# Patient Record
Sex: FEMALE | Race: WHITE | Employment: UNEMPLOYED | URBAN - METROPOLITAN AREA
[De-identification: names, ages, dates, MRNs, and addresses within clinical notes are randomized per-mention and may not be internally consistent; named-entity substitution may affect disease eponyms.]

---

## 2021-09-12 ENCOUNTER — HOSPITAL ENCOUNTER (EMERGENCY)
Age: 21
Discharge: HOME | End: 2021-09-12
Payer: COMMERCIAL

## 2021-09-12 VITALS
DIASTOLIC BLOOD PRESSURE: 73 MMHG | HEART RATE: 79 BPM | RESPIRATION RATE: 16 BRPM | SYSTOLIC BLOOD PRESSURE: 116 MMHG | TEMPERATURE: 98.1 F | HEIGHT: 65 IN | OXYGEN SATURATION: 96 % | BODY MASS INDEX: 17.04 KG/M2 | WEIGHT: 102.29 LBS

## 2021-09-12 DIAGNOSIS — B37.31 VAGINAL CANDIDIASIS: Primary | ICD-10-CM

## 2021-09-12 LAB
AMORPHOUS: ABNORMAL /HPF
BACTERIA WET PREP: ABNORMAL
BACTERIA: ABNORMAL /HPF
BILIRUBIN URINE: NEGATIVE
BLOOD, URINE: ABNORMAL
CLARITY: ABNORMAL
CLUE CELLS: ABNORMAL
COARSE CASTS, UA: ABNORMAL /LPF (ref 0–2)
COLOR: YELLOW
EPITHELIAL CELLS WET PREP: ABNORMAL
EPITHELIAL CELLS, UA: ABNORMAL /HPF (ref 0–5)
GLUCOSE URINE: NEGATIVE MG/DL
HCG(URINE) PREGNANCY TEST: NEGATIVE
KETONES, URINE: NEGATIVE MG/DL
LEUKOCYTE ESTERASE, URINE: ABNORMAL
MICROSCOPIC EXAMINATION: YES
NITRITE, URINE: NEGATIVE
PH UA: 7.5 (ref 5–8)
PROTEIN UA: NEGATIVE MG/DL
RBC UA: ABNORMAL /HPF (ref 0–4)
RBC WET PREP: ABNORMAL
SOURCE WET PREP: ABNORMAL
SPECIFIC GRAVITY UA: 1.01 (ref 1–1.03)
TRICHOMONAS PREP: ABNORMAL
URINE REFLEX TO CULTURE: ABNORMAL
URINE TYPE: ABNORMAL
UROBILINOGEN, URINE: 0.2 E.U./DL
WBC UA: ABNORMAL /HPF (ref 0–5)
WBC WET PREP: ABNORMAL
YEAST WET PREP: ABNORMAL
YEAST: ABNORMAL /HPF

## 2021-09-12 PROCEDURE — 84703 CHORIONIC GONADOTROPIN ASSAY: CPT

## 2021-09-12 PROCEDURE — 87591 N.GONORRHOEAE DNA AMP PROB: CPT

## 2021-09-12 PROCEDURE — 99283 EMERGENCY DEPT VISIT LOW MDM: CPT

## 2021-09-12 PROCEDURE — 87210 SMEAR WET MOUNT SALINE/INK: CPT

## 2021-09-12 PROCEDURE — 81001 URINALYSIS AUTO W/SCOPE: CPT

## 2021-09-12 PROCEDURE — 87491 CHLMYD TRACH DNA AMP PROBE: CPT

## 2021-09-12 RX ORDER — FLUCONAZOLE 150 MG/1
150 TABLET ORAL ONCE
Qty: 2 TABLET | Refills: 0 | Status: SHIPPED | OUTPATIENT
Start: 2021-09-12 | End: 2021-09-12

## 2021-09-12 ASSESSMENT — ENCOUNTER SYMPTOMS
BACK PAIN: 0
NAUSEA: 0
VOMITING: 0
EYE REDNESS: 0
SORE THROAT: 0
ABDOMINAL PAIN: 0
APNEA: 0
EYE DISCHARGE: 0
SHORTNESS OF BREATH: 0
CHOKING: 0
FACIAL SWELLING: 0

## 2021-09-13 NOTE — ED PROVIDER NOTES
Pt asked about getting her nightly trazodone. It was not listed in home medications or current MAR. Pt gave this nurse a list of her home medications and it was included. Pt stated that ED and 5T nurses asked about this med with med reconciliation. Unable to locate. Added to home medications and message sent to hospitalist to see if this can be added back in.   .Electronically signed by Nabil Rob RN on 9/25/2019 at 10:17 PM nausea and vomiting. Genitourinary: Positive for vaginal discharge. Negative for dysuria, menstrual problem, pelvic pain, vaginal bleeding and vaginal pain. Musculoskeletal: Negative for back pain, neck pain and neck stiffness. Neurological: Negative for dizziness, tremors, seizures, weakness and headaches. All other systems reviewed and are negative. \"Positives and Pertinent negatives as per HPI\"    Physical Exam:  Physical Exam  Vitals and nursing note reviewed. Constitutional:       Appearance: She is well-developed. She is not diaphoretic. HENT:      Head: Normocephalic and atraumatic. Nose: Nose normal.   Eyes:      General:         Right eye: No discharge. Left eye: No discharge. Cardiovascular:      Rate and Rhythm: Normal rate and regular rhythm. Heart sounds: Normal heart sounds. No murmur heard. No friction rub. No gallop. Pulmonary:      Effort: Pulmonary effort is normal. No respiratory distress. Breath sounds: Normal breath sounds. No wheezing or rales. Chest:      Chest wall: No tenderness. Abdominal:      General: Abdomen is flat. Bowel sounds are normal. There is no distension. Palpations: There is no mass. Tenderness: There is no abdominal tenderness. There is no guarding or rebound. Musculoskeletal:         General: Normal range of motion. Cervical back: Normal range of motion and neck supple. Skin:     General: Skin is warm and dry. Neurological:      Mental Status: She is alert and oriented to person, place, and time.    Psychiatric:         Behavior: Behavior normal.         MEDICAL DECISION MAKING    Vitals:    Vitals:    09/12/21 2035   BP: 116/73   Pulse: 79   Resp: 16   Temp: 98.1 °F (36.7 °C)   TempSrc: Oral   SpO2: 96%   Weight: 102 lb 4.7 oz (46.4 kg)   Height: 5' 5\" (1.651 m)       LABS:  Labs Reviewed   WET PREP, GENITAL - Abnormal; Notable for the following components:       Result Value    Yeast, Wet Prep 3+ (*) All other components within normal limits    Narrative:     Performed at:  [facility]  [address] [place], [facility]   Phone [phone]   URINE RT REFLEX TO CULTURE - Abnormal; Notable for the following components:    Clarity, UA SL CLOUDY (*)     Blood, Urine MODERATE (*)     Leukocyte Esterase, Urine MODERATE (*)     All other components within normal limits    Narrative:     Performed at:  [facility]  [address] [place], [facility]   Phone [phone]   MICROSCOPIC URINALYSIS - Abnormal; Notable for the following components:    Coarse Casts, UA 3-5 (*)     WBC, UA 6-9 (*)     Bacteria, UA 2+ (*)     Yeast, UA see below (*)     All other components within normal limits    Narrative:     Performed at:  [facility]  [address] [place], [facility]   Phone [phone] DNA   PREGNANCY, URINE    Narrative:     Performed at:  [facility]  [address] [place], [facility]   Phone [phone] of labs reviewed and were negative at this time or not returned at the time of this note. RADIOLOGY:   Non-plain film images such as CT, Ultrasound and MRI are read by the radiologist. Kallie Scott PA-C have directly visualized the radiologic plain film image(s) with the below findings:      Interpretation per the Radiologist below, if available at the time of this note:    No orders to display        No results found. MEDICAL DECISION MAKING / ED COURSE:      PROCEDURES:   Procedures    None    Patient was given:  Medications - No data to display    Emergency room course: Patient on exam cardiovascular regular rhythm, lungs are clear. No wheeze rales or rhonchi noted. Abdomen soft nontender. Nondistended. Normal bowel sounds all 4 quadrant.   No CVA or flank tenderness. Patient did her own vaginal swab no pelvic exam was performed. She was okay with this and I was okay with this. No abdominal pain and no vaginal bleeding. Patient is alert oriented x4. Does not appear to be in acute distress. Lab result from today shows: Wet prep shows negative for trichomonas, 3+ yeast and negative clue cells. Urinalysis show moderate leukocytes, negative for nitrites, moderate blood negative for ketones. Negative for pregnancy. Microscopically WBC 6-9, RBC 3-4, epithelial cells 2-5, bacteria 2+. Urine positive for budding yeast.    At this time I discussed patient urine. Urine results and wet prep results from today with her. Gonorrhea chlamydia results are pending she will be notified of his positive and treated appropriately at that time. For today I will treat her for the yeast infection. Give her Diflucan. Await for urine cultures since she is not having any pain with urination. She was okay with this plan. Follow-up with primary care physician and return for any worsening. cultures      The patient tolerated their visit well. I evaluated the patient. The physician was available for consultation as needed. The patient and / or the family were informed of the results of any tests, a time was given to answer questions, a plan was proposed and they agreed with plan. CLINICAL IMPRESSION:  1.  Vaginal candidiasis        DISPOSITION  DISPOSITION Decision To Discharge 09/12/2021 09:35:34 PM      PATIENT REFERRED TO:  [facility]) Pre-Services  [address] [place] [address]  [phone]  Call   As needed      DISCHARGE MEDICATIONS:  New Prescriptions    FLUCONAZOLE (DIFLUCAN) 150 MG TABLET    Take 1 tablet by mouth once for 1 dose Take one and repeat in 1 week       DISCONTINUED MEDICATIONS:  Discontinued Medications    OXYCODONE-ACETAMINOPHEN (PERCOCET) 5-325 MG PER TABLET    Take 1 tablet by mouth every 4 hours as needed for Pain .     TACROLIMUS ER PO    Take by mouth              (Please note the MDM and HPI sections of this note were completed with a voice recognition program.  Efforts were made to edit the dictations but occasionally words are mis-transcribed.)    Electronically signed, Lior Clark PA-C,          Lior Clark PA-C  09/12/21 2138       Lior Clark PA-C  09/12/21 2135

## 2021-09-14 LAB
C TRACH DNA GENITAL QL NAA+PROBE: NEGATIVE
N. GONORRHOEAE DNA: POSITIVE

## 2021-09-14 NOTE — RESULT ENCOUNTER NOTE
Patient's positive result has been appropriately evaluated by the provider pool. Patient was contacted and notified of the change in treatment plan. Instructed to go to an ED or PMD for treatment.     Pharmacy: No Pharmacies Listed  Phone number:   Pharmacist receiving the prescription:

## 2021-09-29 ENCOUNTER — HOSPITAL ENCOUNTER (EMERGENCY)
Age: 21
Discharge: HOME | End: 2021-09-29
Attending: EMERGENCY MEDICINE
Payer: COMMERCIAL

## 2021-09-29 VITALS
SYSTOLIC BLOOD PRESSURE: 111 MMHG | OXYGEN SATURATION: 99 % | RESPIRATION RATE: 16 BRPM | DIASTOLIC BLOOD PRESSURE: 63 MMHG | HEIGHT: 66 IN | TEMPERATURE: 98.5 F | BODY MASS INDEX: 16.33 KG/M2 | WEIGHT: 101.63 LBS | HEART RATE: 89 BPM

## 2021-09-29 DIAGNOSIS — Z76.89 ENCOUNTER FOR ASSESSMENT OF STD EXPOSURE: Primary | ICD-10-CM

## 2021-09-29 LAB
BACTERIA WET PREP: ABNORMAL
BILIRUBIN URINE: NEGATIVE
BLOOD, URINE: NEGATIVE
CLARITY: CLEAR
CLUE CELLS: ABNORMAL
COLOR: YELLOW
EPITHELIAL CELLS WET PREP: ABNORMAL
GLUCOSE URINE: NEGATIVE MG/DL
HCG(URINE) PREGNANCY TEST: NEGATIVE
KETONES, URINE: NEGATIVE MG/DL
LEUKOCYTE ESTERASE, URINE: NEGATIVE
MICROSCOPIC EXAMINATION: NORMAL
NITRITE, URINE: NEGATIVE
PH UA: 6 (ref 5–8)
PROTEIN UA: NEGATIVE MG/DL
RBC WET PREP: ABNORMAL
SOURCE WET PREP: ABNORMAL
SPECIFIC GRAVITY UA: 1.02 (ref 1–1.03)
TRICHOMONAS PREP: ABNORMAL
URINE REFLEX TO CULTURE: NORMAL
URINE TYPE: NORMAL
UROBILINOGEN, URINE: 1 E.U./DL
WBC WET PREP: ABNORMAL
YEAST WET PREP: ABNORMAL

## 2021-09-29 PROCEDURE — 99283 EMERGENCY DEPT VISIT LOW MDM: CPT

## 2021-09-29 PROCEDURE — 87491 CHLMYD TRACH DNA AMP PROBE: CPT

## 2021-09-29 PROCEDURE — 96372 THER/PROPH/DIAG INJ SC/IM: CPT

## 2021-09-29 PROCEDURE — 87210 SMEAR WET MOUNT SALINE/INK: CPT

## 2021-09-29 PROCEDURE — 87591 N.GONORRHOEAE DNA AMP PROB: CPT

## 2021-09-29 PROCEDURE — 81003 URINALYSIS AUTO W/O SCOPE: CPT

## 2021-09-29 PROCEDURE — 84703 CHORIONIC GONADOTROPIN ASSAY: CPT

## 2021-09-29 PROCEDURE — 6360000002 HC RX W HCPCS: Performed by: EMERGENCY MEDICINE

## 2021-09-29 RX ORDER — DOXYCYCLINE 100 MG/1
100 TABLET ORAL 2 TIMES DAILY
Qty: 14 TABLET | Refills: 0 | Status: SHIPPED | OUTPATIENT
Start: 2021-09-29 | End: 2021-10-06

## 2021-09-29 RX ORDER — CEFTRIAXONE 500 MG/1
500 INJECTION, POWDER, FOR SOLUTION INTRAMUSCULAR; INTRAVENOUS ONCE
Status: COMPLETED | OUTPATIENT
Start: 2021-09-29 | End: 2021-09-29

## 2021-09-29 RX ADMIN — CEFTRIAXONE SODIUM 500 MG: 500 INJECTION, POWDER, FOR SOLUTION INTRAMUSCULAR; INTRAVENOUS at 13:20

## 2021-09-29 NOTE — ED PROVIDER NOTES
CHIEF COMPLAINT  Exposure to STD (pt concerned about std c/o vaginal discharge few days with odor)      HISTORY OF PRESENT ILLNESS  Fransisco Armando is a 22 y.o. female who presents to the ED complaining of potential exposure to an STD. She states her boyfriend has been having penile discharge for last 2 to 3 days. She reports a slightly increase in vaginal discharge but no other complaints at this time. .   No other complaints, modifying factors or associated symptoms. Nursing notes reviewed. Past Medical History:   Diagnosis Date    Biliary atresia      Past Surgical History:   Procedure Laterality Date    LIVER TRANSPLANT  2001     No family history on file. Social History     Socioeconomic History    Marital status: Single     Spouse name: Not on file    Number of children: Not on file    Years of education: Not on file    Highest education level: Not on file   Occupational History    Not on file   Tobacco Use    Smoking status: Current Every Day Smoker     Packs/day: 0.50    Smokeless tobacco: Current User   Substance and Sexual Activity    Alcohol use: Yes     Comment: occasional    Drug use: Yes     Types: Marijuana    Sexual activity: Not on file   Other Topics Concern    Not on file   Social History Narrative    Not on file     Social Determinants of Health     Financial Resource Strain:     Difficulty of Paying Living Expenses:    Food Insecurity:     Worried About Running Out of Food in the Last Year:     [address] in the Last Year:    Transportation Needs:     Lack of Transportation (Medical):      Lack of Transportation (Non-Medical):    Physical Activity:     Days of Exercise per Week:     Minutes of Exercise per Session:    Stress:     Feeling of Stress :    Social Connections:     Frequency of Communication with Friends and Family:     Frequency of Social Gatherings with Friends and Family:     Attends Taoism Services:     Active Member of Clubs or Organizations:     Attends Club or Organization Meetings:     Marital Status:    Intimate Partner Violence:     Fear of Current or Ex-Partner:     Emotionally Abused:     Physically Abused:     Sexually Abused:      No current facility-administered medications for this encounter. Current Outpatient Medications   Medication Sig Dispense Refill    doxycycline monohydrate (ADOXA) 100 MG tablet Take 1 tablet by mouth 2 times daily for 7 days 14 tablet 0    ibuprofen (ADVIL;MOTRIN) 800 MG tablet Take 800 mg by mouth every 6 hours as needed for Pain      Prenatal MV-Min-Fe Fum-FA-DHA (PRENATAL 1 PO) Take by mouth      tacrolimus (PROGRAF) 1 MG capsule Take 1 mg by mouth 2 times daily. Allergies   Allergen Reactions    Grape (Artificial) Flavor      Pt not allowed to have grape d/t liver transplant.  Grape Flavor [Flavoring Agent]      Pt not allowed to have grape d/t liver transplant.  Grapeseed Extract [Nutritional Supplements]     Proanthocyanidin        REVIEW OF SYSTEMS  6 systems reviewed, pertinent positives per HPI otherwise noted to be negative    PHYSICAL EXAM  /63   Pulse 89   Temp 98.5 °F (36.9 °C) (Oral)   Resp 16   Ht 5' 6\" (1.676 m)   Wt 101 lb 10.1 oz (46.1 kg)   LMP 09/08/2021   SpO2 99%   BMI 16.40 kg/m²   GENERAL APPEARANCE: Awake and alert. Cooperative. No acute distress. HEAD: Normocephalic. Atraumatic. EYES: PERRL. EOM's grossly intact. ENT: Mucous membranes are moist.   NECK: Supple. Normal ROM. CHEST: Equal symmetric chest rise. LUNGS: Breathing is unlabored. Speaking comfortably in full sentences. ABD: Soft, nontender, nondistended  SKIN: Warm and dry. NEUROLOGICAL: Alert and oriented. Normal coordination. Gait normal.         RADIOLOGY  X-RAYS:  I have reviewed radiologic plain film image(s). ALL OTHER NON-PLAIN FILM IMAGES SUCH AS CT, ULTRASOUND AND MRI HAVE BEEN READ BY THE RADIOLOGIST.   No orders to display              PROCEDURES    ED

## 2021-09-30 LAB
C TRACH DNA GENITAL QL NAA+PROBE: NEGATIVE
N. GONORRHOEAE DNA: POSITIVE

## 2021-09-30 NOTE — RESULT ENCOUNTER NOTE
Culture reviewed, please contact patient and inform them of the results and advised him to complete antibiotics prescribed. ask them to abstain from all sexual activity until symptoms resolve and all partners have been treated. Advise health department follow-up for HIV and syphilis testing.

## 2021-11-06 ENCOUNTER — HOSPITAL ENCOUNTER (EMERGENCY)
Age: 21
Discharge: HOME | End: 2021-11-06
Attending: EMERGENCY MEDICINE
Payer: COMMERCIAL

## 2021-11-06 VITALS
HEIGHT: 66 IN | HEART RATE: 106 BPM | RESPIRATION RATE: 16 BRPM | OXYGEN SATURATION: 98 % | BODY MASS INDEX: 16.33 KG/M2 | TEMPERATURE: 97.8 F | SYSTOLIC BLOOD PRESSURE: 124 MMHG | WEIGHT: 101.63 LBS | DIASTOLIC BLOOD PRESSURE: 103 MMHG

## 2021-11-06 DIAGNOSIS — N76.0 BACTERIAL VAGINOSIS: Primary | ICD-10-CM

## 2021-11-06 DIAGNOSIS — B96.89 BACTERIAL VAGINOSIS: Primary | ICD-10-CM

## 2021-11-06 LAB
AMORPHOUS: ABNORMAL /HPF
BACTERIA WET PREP: ABNORMAL
BILIRUBIN URINE: NEGATIVE
BLOOD, URINE: ABNORMAL
CLARITY: ABNORMAL
CLUE CELLS: ABNORMAL
COLOR: ABNORMAL
EPITHELIAL CELLS WET PREP: ABNORMAL
EPITHELIAL CELLS, UA: ABNORMAL /HPF (ref 0–5)
GLUCOSE URINE: NEGATIVE MG/DL
HCG(URINE) PREGNANCY TEST: NEGATIVE
KETONES, URINE: NEGATIVE MG/DL
LEUKOCYTE ESTERASE, URINE: NEGATIVE
MICROSCOPIC EXAMINATION: YES
NITRITE, URINE: NEGATIVE
PH UA: 6 (ref 5–8)
PROTEIN UA: NEGATIVE MG/DL
RBC UA: ABNORMAL /HPF (ref 0–4)
RBC WET PREP: ABNORMAL
SOURCE WET PREP: ABNORMAL
SPECIFIC GRAVITY UA: >=1.03 (ref 1–1.03)
TRICHOMONAS PREP: ABNORMAL
URINE REFLEX TO CULTURE: ABNORMAL
URINE TYPE: ABNORMAL
UROBILINOGEN, URINE: 0.2 E.U./DL
WBC UA: ABNORMAL /HPF (ref 0–5)
WBC WET PREP: ABNORMAL
YEAST WET PREP: ABNORMAL

## 2021-11-06 PROCEDURE — 87591 N.GONORRHOEAE DNA AMP PROB: CPT

## 2021-11-06 PROCEDURE — 84703 CHORIONIC GONADOTROPIN ASSAY: CPT

## 2021-11-06 PROCEDURE — 81001 URINALYSIS AUTO W/SCOPE: CPT

## 2021-11-06 PROCEDURE — 87491 CHLMYD TRACH DNA AMP PROBE: CPT

## 2021-11-06 PROCEDURE — 87210 SMEAR WET MOUNT SALINE/INK: CPT

## 2021-11-06 PROCEDURE — 99284 EMERGENCY DEPT VISIT MOD MDM: CPT

## 2021-11-06 RX ORDER — METRONIDAZOLE 500 MG/1
500 TABLET ORAL 2 TIMES DAILY
Qty: 14 TABLET | Refills: 0 | Status: SHIPPED | OUTPATIENT
Start: 2021-11-06 | End: 2021-11-13

## 2021-11-06 NOTE — ED NOTES
P/c home with avs no s.s of distress noted script x 1 sent to [address] [place], [place] [address]radhames. [address], RN  11/06/21 4651

## 2021-11-06 NOTE — ED PROVIDER NOTES
Triage Chief Complaint:   Dysuria (painful urination and odor with urination , unsure if its  uti or std )      [place]:  Lance Hou is a 22 y.o. female that presents to the emergency department with dysuria for the past few days. She is unsure if it is an STD or UTI. She has recently had unprotected intercourse. She denies clark pain, nausea, vomiting, flank pain, pelvic pain. .    Past Medical History:   Diagnosis Date    Biliary atresia      Past Surgical History:   Procedure Laterality Date    LIVER TRANSPLANT  2001     History reviewed. No pertinent family history. Social History     Socioeconomic History    Marital status: Single     Spouse name: Not on file    Number of children: Not on file    Years of education: Not on file    Highest education level: Not on file   Occupational History    Not on file   Tobacco Use    Smoking status: Current Every Day Smoker     Packs/day: 0.50    Smokeless tobacco: Current User   Substance and Sexual Activity    Alcohol use: Yes     Comment: occasional    Drug use: Yes     Types: Marijuana [place])    Sexual activity: Not on file   Other Topics Concern    Not on file   Social History Narrative    Not on file     Social Determinants of Health     Financial Resource Strain:     Difficulty of Paying Living Expenses: Not on file   Food Insecurity:     Worried About Running Out of Food in the Last Year: Not on file    [place] of Food in the Last Year: Not on file   Transportation Needs:     Lack of Transportation (Medical): Not on file    Lack of Transportation (Non-Medical):  Not on file   Physical Activity:     Days of Exercise per Week: Not on file    Minutes of Exercise per Session: Not on file   Stress:     Feeling of Stress : Not on file   Social Connections:     Frequency of Communication with Friends and Family: Not on file    Frequency of Social Gatherings with Friends and Family: Not on file    Attends Zoroastrianism Services: Not on file   [place] Active Member of Clubs or Organizations: Not on file    Attends Club or Organization Meetings: Not on file    Marital Status: Not on file   Intimate Partner Violence:     Fear of Current or Ex-Partner: Not on file    Emotionally Abused: Not on file    Physically Abused: Not on file    Sexually Abused: Not on file   Housing Stability:     Unable to Pay for Housing in the Last Year: Not on file    Number of [place] in the Last Year: Not on file    Unstable Housing in the Last Year: Not on file     No current facility-administered medications for this encounter. Current Outpatient Medications   Medication Sig Dispense Refill    metroNIDAZOLE (FLAGYL) 500 MG tablet Take 1 tablet by mouth 2 times daily for 7 days 14 tablet 0    ibuprofen (ADVIL;MOTRIN) 800 MG tablet Take 800 mg by mouth every 6 hours as needed for Pain      Prenatal MV-Min-Fe Fum-FA-DHA (PRENATAL 1 PO) Take by mouth      tacrolimus (PROGRAF) 1 MG capsule Take 1 mg by mouth 2 times daily. Allergies   Allergen Reactions    Grape (Artificial) Flavor      Pt not allowed to have grape d/t liver transplant.  Grape Flavor [Flavoring Agent]      Pt not allowed to have grape d/t liver transplant.  Grapeseed Extract [Nutritional Supplements]     Proanthocyanidin      Nursing Notes Reviewed    ROS:  At least 10 systems reviewed and otherwise negative except as in the [address] Physical Exam:  ED Triage Vitals [11/06/21 1700]   Enc Vitals Group      BP (!) 124/103      Pulse 106      Resp 16      Temp 97.8 °F (36.6 °C)      Temp Source Oral      SpO2 98 %      Weight 101 lb 10.1 oz (46.1 kg)      Height 5' 6\" (1.676 m)      Head Circumference       Peak Flow       Pain Score       Pain Loc       Pain Edu? Excl. in [address]? My pulse oximetry interpretation is which is within the normal range    GENERAL APPEARANCE: Awake and alert. Cooperative. No acute distress. HEAD:  Atraumatic. EYES: EOM's grossly intact.    ENT: Mucous membranes are moist.  No trismus. NECK:  Trachea midline. ABDOMEN: Soft. Non-tender. No guarding or rebound. EXTREMITIES: No acute deformities. SKIN: Warm and dry. NEUROLOGICAL: No gross facial drooping. Moves all 4 extremities spontaneously. PSYCHIATRIC: Normal mood. I have reviewed and interpreted all of the currently available lab results from this visit (if applicable):  Results for orders placed or performed during the hospital encounter of 11/06/21   Wet prep, genital    Specimen: Vaginal   Result Value Ref Range    Trichomonas Prep None Seen     Yeast, Wet Prep None Seen     Clue Cells, Wet Prep 2+ (A)     WBC, Wet Prep <1+     RBC, Wet Prep None Seen     Epi Cells 2+     Bacteria 2+     Source Wet Prep Vaginal    Urine, reflex to culture    Specimen: Urine, clean catch   Result Value Ref Range    Color, UA DARK YELLOW (A) Straw/Yellow    Clarity, UA CLOUDY (A) Clear    Glucose, Ur Negative Negative mg/dL    Bilirubin Urine Negative Negative    Ketones, Urine Negative Negative mg/dL    Specific Gravity, UA >=1.030 1.005 - 1.030    Blood, Urine TRACE-INTACT (A) Negative    pH, UA 6.0 5.0 - 8.0    Protein, UA Negative Negative mg/dL    Urobilinogen, Urine 0.2 <2.0 E.U./dL    Nitrite, Urine Negative Negative    Leukocyte Esterase, Urine Negative Negative    Microscopic Examination YES     Urine Type NotGiven     Urine Reflex to Culture Not Indicated    Pregnancy, Urine   Result Value Ref Range    HCG(Urine) Pregnancy Test Negative Detects HCG level >20 MIU/mL   Microscopic Urinalysis   Result Value Ref Range    WBC, UA 0-2 0 - 5 /HPF    RBC, UA 0-2 0 - 4 /HPF    Epithelial Cells, UA 6-10 (A) 0 - 5 /HPF    Amorphous, UA 3+ /HPF          EKG: (All EKG's are interpreted by myself in the absence of a cardiologist)      MDM:  Patient urinalysis shows no signs of infection. She is not pregnant. She is positive for bacterial vaginosis. I will start her on Flagyl.   I instructed her that the gonorrhea and chlamydia swabs will take several days to return and that we will call her if these are positive. I recommended she tell sexual partners of her symptoms and follow-up with her either primary care physician or the health department for further and complete STD testing. Clinical Impression:  1. Bacterial vaginosis        Disposition Vitals:  [unfilled], [unfilled], [unfilled], [unfilled]    Disposition referral (if applicable):  No follow-up provider specified.     Disposition medications (if applicable):  New Prescriptions    METRONIDAZOLE (FLAGYL) 500 MG TABLET    Take 1 tablet by mouth 2 times daily for 7 days         (Please note that portions of this note may have been completed with a voice recognition program. Efforts were made to edit the dictations but occasionally words are mis-transcribed.)    MD Sharath Shane MD  11/06/21 0627

## 2021-11-08 LAB
C TRACH DNA GENITAL QL NAA+PROBE: NEGATIVE
N. GONORRHOEAE DNA: NEGATIVE

## 2021-12-23 ENCOUNTER — HOSPITAL ENCOUNTER (EMERGENCY)
Age: 21
Discharge: HOME | End: 2021-12-23
Payer: COMMERCIAL

## 2021-12-23 VITALS
SYSTOLIC BLOOD PRESSURE: 110 MMHG | WEIGHT: 98.55 LBS | RESPIRATION RATE: 17 BRPM | HEIGHT: 64 IN | TEMPERATURE: 98 F | DIASTOLIC BLOOD PRESSURE: 63 MMHG | HEART RATE: 91 BPM | OXYGEN SATURATION: 99 % | BODY MASS INDEX: 16.82 KG/M2

## 2021-12-23 DIAGNOSIS — B96.89 BV (BACTERIAL VAGINOSIS): Primary | ICD-10-CM

## 2021-12-23 DIAGNOSIS — B37.31 CANDIDAL VULVOVAGINITIS: ICD-10-CM

## 2021-12-23 DIAGNOSIS — N76.0 BV (BACTERIAL VAGINOSIS): Primary | ICD-10-CM

## 2021-12-23 LAB
BACTERIA WET PREP: ABNORMAL
BILIRUBIN URINE: NEGATIVE
BLOOD, URINE: NEGATIVE
CLARITY: ABNORMAL
CLUE CELLS: ABNORMAL
COLOR: YELLOW
EPITHELIAL CELLS WET PREP: ABNORMAL
GLUCOSE URINE: NEGATIVE MG/DL
HCG(URINE) PREGNANCY TEST: NEGATIVE
KETONES, URINE: NEGATIVE MG/DL
LEUKOCYTE ESTERASE, URINE: NEGATIVE
MICROSCOPIC EXAMINATION: ABNORMAL
NITRITE, URINE: NEGATIVE
PH UA: 6.5 (ref 5–8)
PROTEIN UA: NEGATIVE MG/DL
RBC WET PREP: ABNORMAL
SOURCE WET PREP: ABNORMAL
SPECIFIC GRAVITY UA: 1.02 (ref 1–1.03)
TRICHOMONAS PREP: ABNORMAL
URINE TYPE: ABNORMAL
UROBILINOGEN, URINE: 0.2 E.U./DL
WBC WET PREP: ABNORMAL
YEAST WET PREP: ABNORMAL

## 2021-12-23 PROCEDURE — 87591 N.GONORRHOEAE DNA AMP PROB: CPT

## 2021-12-23 PROCEDURE — 87491 CHLMYD TRACH DNA AMP PROBE: CPT

## 2021-12-23 PROCEDURE — 84703 CHORIONIC GONADOTROPIN ASSAY: CPT

## 2021-12-23 PROCEDURE — 87210 SMEAR WET MOUNT SALINE/INK: CPT

## 2021-12-23 PROCEDURE — 81003 URINALYSIS AUTO W/O SCOPE: CPT

## 2021-12-23 PROCEDURE — 99282 EMERGENCY DEPT VISIT SF MDM: CPT

## 2021-12-23 RX ORDER — FLUCONAZOLE 150 MG/1
150 TABLET ORAL ONCE
Qty: 1 TABLET | Refills: 0 | Status: SHIPPED | OUTPATIENT
Start: 2021-12-23 | End: 2021-12-23

## 2021-12-23 RX ORDER — METRONIDAZOLE 500 MG/1
500 TABLET ORAL 2 TIMES DAILY
Qty: 14 TABLET | Refills: 0 | Status: SHIPPED | OUTPATIENT
Start: 2021-12-23 | End: 2021-12-30

## 2021-12-23 NOTE — ED PROVIDER NOTES
[facility] Emergency Department    CHIEF COMPLAINT  Dysuria (c/o vaginal odor with frequent urination past few days)      SHARED SERVICE VISIT  Evaluated by ASHWIN. My supervising physician was available for consultation. HISTORY OF PRESENT ILLNESS  Kassie Jorgensen is a 22 y.o. female who presents to the ED complaining of a vaginal odor and increased urinary frequency over the past few days. States she has had this once before in the past couple months ago and was treated. Denies concerns for STDs. Denies any burning when she urinates or blood in her urine. No abdominal pain or diarrhea. Patient does report increase in vaginal discharge. No fevers or chills. No other complaints, modifying factors or associated symptoms. Nursing notes reviewed. Past Medical History:   Diagnosis Date    Biliary atresia      Past Surgical History:   Procedure Laterality Date    LIVER TRANSPLANT  2001     No family history on file. Social History     Socioeconomic History    Marital status: Single     Spouse name: Not on file    Number of children: Not on file    Years of education: Not on file    Highest education level: Not on file   Occupational History    Not on file   Tobacco Use    Smoking status: Current Every Day Smoker     Packs/day: 0.50    Smokeless tobacco: Current User   Substance and Sexual Activity    Alcohol use: Yes     Comment: occasional    Drug use: Yes     Types: Marijuana Kenneth Camilo    Sexual activity: Not on file   Other Topics Concern    Not on file   Social History Narrative    Not on file     Social Determinants of Health     Financial Resource Strain:     Difficulty of Paying Living Expenses: Not on file   Food Insecurity:     Worried About Running Out of Food in the Last Year: Not on file    Ellie of Food in the Last Year: Not on file   Transportation Needs:     Lack of Transportation (Medical):  Not on file    Lack of Transportation (Non-Medical): Not on file   Physical Activity:     Days of Exercise per Week: Not on file    Minutes of Exercise per Session: Not on file   Stress:     Feeling of Stress : Not on file   Social Connections:     Frequency of Communication with Friends and Family: Not on file    Frequency of Social Gatherings with Friends and Family: Not on file    Attends [employer] Services: Not on file    Active Member of [address] [place] [address] or Organizations: Not on file    Attends Club or Organization Meetings: Not on file    Marital Status: Not on file   Intimate Partner Violence:     Fear of Current or Ex-Partner: Not on file    Emotionally Abused: Not on file    Physically Abused: Not on file    Sexually Abused: Not on file   Housing Stability:     Unable to Pay for Housing in the Last Year: Not on file    Number of [place] in the Last Year: Not on file    Unstable Housing in the Last Year: Not on file     No current facility-administered medications for this encounter. Current Outpatient Medications   Medication Sig Dispense Refill    metroNIDAZOLE (FLAGYL) 500 MG tablet Take 1 tablet by mouth 2 times daily for 7 days 14 tablet 0    fluconazole (DIFLUCAN) 150 MG tablet Take 1 tablet by mouth once for 1 dose 1 tablet 0    ibuprofen (ADVIL;MOTRIN) 800 MG tablet Take 800 mg by mouth every 6 hours as needed for Pain      Prenatal MV-Min-Fe Fum-FA-DHA (PRENATAL 1 PO) Take by mouth      tacrolimus (PROGRAF) 1 MG capsule Take 1 mg by mouth 2 times daily. Allergies   Allergen Reactions    Grape (Artificial) Flavor      Pt not allowed to have grape d/t liver transplant.  Grape Flavor [Flavoring Agent]      Pt not allowed to have grape d/t liver transplant.     Grapeseed Extract [Nutritional Supplements]     Proanthocyanidin        REVIEW OF SYSTEMS  7 systems reviewed, pertinent positives per HPI otherwise noted to be negative    PHYSICAL EXAM  /63   Pulse 91   Temp 98 °F (36.7 °C) (Oral)   Resp 17   Ht 5' 4\" (1.626 m)   Wt 98 lb 8.7 oz (44.7 kg)   SpO2 99%   BMI 16.92 kg/m²   GENERAL APPEARANCE: Awake and alert. Cooperative. Kassidy Fanning HEAD: Normocephalic. Atraumatic. EYES: EOM grossly intact. ENT: Mucous membranes are moist.   NECK: Supple. HEART: Regular rate  ABDOMINAL: Flat, no rebound or rigidity. No tenderness. EXTREMITIES: No peripheral edema. Moves all extremities equally. SKIN: Warm and dry. No acute rashes. NEUROLOGICAL: Alert and oriented. No gross facial drooping. PSYCHIATRIC: Normal mood and affect. RADIOLOGY  No orders to display         LABS  Labs Reviewed   WET PREP, GENITAL - Abnormal; Notable for the following components:       Result Value    Yeast, Wet Prep 1+ (*)     Clue Cells, Wet Prep 2+ (*)     All other components within normal limits    Narrative:     Performed at:  [facility]  [address] [place], [place] [facility]   Phone [phone]   URINALYSIS - Abnormal; Notable for the following components:    Clarity, UA SL CLOUDY (*)     All other components within normal limits    Narrative:     Performed at:  [facility]  [address] [place], [place] [facility]   Phone [phone] DNA, URINE   CULTURE, URINE   PREGNANCY, URINE    Narrative:     Performed at:  [facility]  [address] [place], [place] [facility]   Phone [phone]       PROCEDURES  Unless otherwise noted below, none  Procedures          MDM  46-year-old female presents emergency department for evaluation of increased urinary frequency as well as a vaginal odor. She reports similar episode in the past when she was seen here about a month ago and diagnosed with bacterial vaginosis. Vitals within normal limits she is afebrile nontachycardic. Urinalysis demonstrated positive for clue cells as well as yeast.  Pregnancy negative. Nitrate negative.     Patient

## 2021-12-24 LAB
C TRACH DNA GENITAL QL NAA+PROBE: NEGATIVE
N. GONORRHOEAE DNA: NEGATIVE

## 2023-12-11 ENCOUNTER — APPOINTMENT (OUTPATIENT)
Dept: CT IMAGING | Age: 23
End: 2023-12-11
Payer: COMMERCIAL

## 2023-12-11 ENCOUNTER — HOSPITAL ENCOUNTER (EMERGENCY)
Age: 23
Discharge: HOME | End: 2023-12-12
Attending: EMERGENCY MEDICINE
Payer: COMMERCIAL

## 2023-12-11 ENCOUNTER — APPOINTMENT (OUTPATIENT)
Dept: GENERAL RADIOLOGY | Age: 23
End: 2023-12-11
Payer: COMMERCIAL

## 2023-12-11 VITALS
DIASTOLIC BLOOD PRESSURE: 63 MMHG | RESPIRATION RATE: 16 BRPM | SYSTOLIC BLOOD PRESSURE: 100 MMHG | WEIGHT: 125 LBS | BODY MASS INDEX: 19.62 KG/M2 | HEIGHT: 67 IN | HEART RATE: 85 BPM | TEMPERATURE: 97.7 F | OXYGEN SATURATION: 99 %

## 2023-12-11 DIAGNOSIS — U07.1 COVID: Primary | ICD-10-CM

## 2023-12-11 DIAGNOSIS — E86.0 DEHYDRATION: ICD-10-CM

## 2023-12-11 DIAGNOSIS — A59.9 TRICHOMONAL INFECTION: ICD-10-CM

## 2023-12-11 DIAGNOSIS — N30.01 ACUTE CYSTITIS WITH HEMATURIA: ICD-10-CM

## 2023-12-11 DIAGNOSIS — R11.2 NAUSEA AND VOMITING, UNSPECIFIED VOMITING TYPE: ICD-10-CM

## 2023-12-11 LAB
ALBUMIN SERPL-MCNC: 3.7 G/DL (ref 3.4–5)
ALBUMIN/GLOB SERPL: 1 {RATIO} (ref 1.1–2.2)
ALP SERPL-CCNC: 61 U/L (ref 40–129)
ALT SERPL-CCNC: 24 U/L (ref 10–40)
ANION GAP SERPL CALCULATED.3IONS-SCNC: 14 MMOL/L (ref 3–16)
AST SERPL-CCNC: 41 U/L (ref 15–37)
BACTERIA GENITAL QL WET PREP: ABNORMAL
BACTERIA URNS QL MICRO: ABNORMAL /HPF
BASOPHILS # BLD: 0 K/UL (ref 0–0.2)
BASOPHILS NFR BLD: 0.2 %
BILIRUB SERPL-MCNC: 0.3 MG/DL (ref 0–1)
BILIRUB UR QL STRIP.AUTO: ABNORMAL
BUN SERPL-MCNC: 22 MG/DL (ref 7–20)
CALCIUM SERPL-MCNC: 8.7 MG/DL (ref 8.3–10.6)
CHLORIDE SERPL-SCNC: 96 MMOL/L (ref 99–110)
CLARITY UR: ABNORMAL
CLUE CELLS SPEC QL WET PREP: ABNORMAL
CO2 SERPL-SCNC: 24 MMOL/L (ref 21–32)
COLOR UR: YELLOW
CREAT SERPL-MCNC: 1.2 MG/DL (ref 0.6–1.1)
DEPRECATED RDW RBC AUTO: 12.8 % (ref 12.4–15.4)
EOSINOPHIL # BLD: 0 K/UL (ref 0–0.6)
EOSINOPHIL NFR BLD: 0.1 %
EPI CELLS #/AREA URNS HPF: ABNORMAL /HPF (ref 0–5)
EPI CELLS SPEC QL WET PREP: ABNORMAL
FINE GRAN CASTS #/AREA URNS HPF: ABNORMAL /LPF (ref 0–2)
FLUAV RNA RESP QL NAA+PROBE: NOT DETECTED
FLUBV RNA RESP QL NAA+PROBE: NOT DETECTED
GFR SERPLBLD CREATININE-BSD FMLA CKD-EPI: >60 ML/MIN/{1.73_M2}
GLUCOSE SERPL-MCNC: 148 MG/DL (ref 70–99)
GLUCOSE UR STRIP.AUTO-MCNC: NEGATIVE MG/DL
HCG UR QL: NEGATIVE
HCT VFR BLD AUTO: 38.2 % (ref 36–48)
HGB BLD-MCNC: 13.4 G/DL (ref 12–16)
HGB UR QL STRIP.AUTO: NEGATIVE
KETONES UR STRIP.AUTO-MCNC: 15 MG/DL
LEUKOCYTE ESTERASE UR QL STRIP.AUTO: ABNORMAL
LIPASE SERPL-CCNC: 10 U/L (ref 13–60)
LYMPHOCYTES # BLD: 1.4 K/UL (ref 1–5.1)
LYMPHOCYTES NFR BLD: 21.4 %
MCH RBC QN AUTO: 33.8 PG (ref 26–34)
MCHC RBC AUTO-ENTMCNC: 35.1 G/DL (ref 31–36)
MCV RBC AUTO: 96.3 FL (ref 80–100)
MONOCYTES # BLD: 0.5 K/UL (ref 0–1.3)
MONOCYTES NFR BLD: 7.6 %
NEUTROPHILS # BLD: 4.6 K/UL (ref 1.7–7.7)
NEUTROPHILS NFR BLD: 70.7 %
NITRITE UR QL STRIP.AUTO: NEGATIVE
PH UR STRIP.AUTO: 5.5 [PH] (ref 5–8)
PLATELET # BLD AUTO: 155 K/UL (ref 135–450)
PMV BLD AUTO: 8.3 FL (ref 5–10.5)
POTASSIUM SERPL-SCNC: 3.8 MMOL/L (ref 3.5–5.1)
PROT SERPL-MCNC: 7.3 G/DL (ref 6.4–8.2)
PROT UR STRIP.AUTO-MCNC: 30 MG/DL
RBC # BLD AUTO: 3.97 M/UL (ref 4–5.2)
RBC #/AREA URNS HPF: ABNORMAL /HPF (ref 0–4)
RBC SPEC QL WET PREP: ABNORMAL
RENAL EPI CELLS #/AREA UR COMP ASSIST: ABNORMAL /HPF (ref 0–1)
SARS-COV-2 RNA RESP QL NAA+PROBE: DETECTED
SODIUM SERPL-SCNC: 134 MMOL/L (ref 136–145)
SP GR UR STRIP.AUTO: >=1.03 (ref 1–1.03)
SPECIMEN SOURCE FLD: ABNORMAL
T VAGINALIS GENITAL QL WET PREP: ABNORMAL
UA COMPLETE W REFLEX CULTURE PNL UR: YES
UA DIPSTICK W REFLEX MICRO PNL UR: YES
URN SPEC COLLECT METH UR: ABNORMAL
UROBILINOGEN UR STRIP-ACNC: 0.2 E.U./DL
WBC # BLD AUTO: 6.5 K/UL (ref 4–11)
WBC #/AREA URNS HPF: ABNORMAL /HPF (ref 0–5)
WBC SPEC QL WET PREP: ABNORMAL
YEAST GENITAL QL WET PREP: ABNORMAL

## 2023-12-11 PROCEDURE — 87210 SMEAR WET MOUNT SALINE/INK: CPT

## 2023-12-11 PROCEDURE — 2580000003 HC RX 258: Performed by: PHYSICIAN ASSISTANT

## 2023-12-11 PROCEDURE — 85025 COMPLETE CBC W/AUTO DIFF WBC: CPT

## 2023-12-11 PROCEDURE — 87636 SARSCOV2 & INF A&B AMP PRB: CPT

## 2023-12-11 PROCEDURE — 87491 CHLMYD TRACH DNA AMP PROBE: CPT

## 2023-12-11 PROCEDURE — 96374 THER/PROPH/DIAG INJ IV PUSH: CPT

## 2023-12-11 PROCEDURE — 74176 CT ABD & PELVIS W/O CONTRAST: CPT

## 2023-12-11 PROCEDURE — 36415 COLL VENOUS BLD VENIPUNCTURE: CPT

## 2023-12-11 PROCEDURE — 84703 CHORIONIC GONADOTROPIN ASSAY: CPT

## 2023-12-11 PROCEDURE — 83690 ASSAY OF LIPASE: CPT

## 2023-12-11 PROCEDURE — 6370000000 HC RX 637 (ALT 250 FOR IP): Performed by: PHYSICIAN ASSISTANT

## 2023-12-11 PROCEDURE — 99284 EMERGENCY DEPT VISIT MOD MDM: CPT

## 2023-12-11 PROCEDURE — 81001 URINALYSIS AUTO W/SCOPE: CPT

## 2023-12-11 PROCEDURE — 6360000002 HC RX W HCPCS: Performed by: PHYSICIAN ASSISTANT

## 2023-12-11 PROCEDURE — 87591 N.GONORRHOEAE DNA AMP PROB: CPT

## 2023-12-11 PROCEDURE — 71046 X-RAY EXAM CHEST 2 VIEWS: CPT

## 2023-12-11 PROCEDURE — 87086 URINE CULTURE/COLONY COUNT: CPT

## 2023-12-11 PROCEDURE — 96372 THER/PROPH/DIAG INJ SC/IM: CPT

## 2023-12-11 PROCEDURE — 80053 COMPREHEN METABOLIC PANEL: CPT

## 2023-12-11 PROCEDURE — 2500000003 HC RX 250 WO HCPCS: Performed by: PHYSICIAN ASSISTANT

## 2023-12-11 PROCEDURE — 96361 HYDRATE IV INFUSION ADD-ON: CPT

## 2023-12-11 RX ORDER — ONDANSETRON 4 MG/1
4 TABLET, ORALLY DISINTEGRATING ORAL 3 TIMES DAILY PRN
Qty: 21 TABLET | Refills: 0 | Status: SHIPPED | OUTPATIENT
Start: 2023-12-11

## 2023-12-11 RX ORDER — 0.9 % SODIUM CHLORIDE 0.9 %
1000 INTRAVENOUS SOLUTION INTRAVENOUS ONCE
Status: COMPLETED | OUTPATIENT
Start: 2023-12-11 | End: 2023-12-11

## 2023-12-11 RX ORDER — CEFDINIR 300 MG/1
300 CAPSULE ORAL ONCE
Status: COMPLETED | OUTPATIENT
Start: 2023-12-11 | End: 2023-12-11

## 2023-12-11 RX ORDER — ONDANSETRON 2 MG/ML
4 INJECTION INTRAMUSCULAR; INTRAVENOUS ONCE
Status: COMPLETED | OUTPATIENT
Start: 2023-12-11 | End: 2023-12-11

## 2023-12-11 RX ORDER — DOXYCYCLINE HYCLATE 100 MG
100 TABLET ORAL 2 TIMES DAILY
Qty: 14 TABLET | Refills: 0 | Status: SHIPPED | OUTPATIENT
Start: 2023-12-11 | End: 2023-12-18

## 2023-12-11 RX ORDER — METRONIDAZOLE 500 MG/1
500 TABLET ORAL 3 TIMES DAILY
Qty: 21 TABLET | Refills: 0 | Status: SHIPPED | OUTPATIENT
Start: 2023-12-11 | End: 2023-12-18

## 2023-12-11 RX ORDER — ACETAMINOPHEN 500 MG
1000 TABLET ORAL ONCE
Status: COMPLETED | OUTPATIENT
Start: 2023-12-11 | End: 2023-12-11

## 2023-12-11 RX ORDER — METRONIDAZOLE 250 MG/1
500 TABLET ORAL ONCE
Status: COMPLETED | OUTPATIENT
Start: 2023-12-11 | End: 2023-12-11

## 2023-12-11 RX ORDER — CEFDINIR 300 MG/1
300 CAPSULE ORAL 2 TIMES DAILY
Qty: 14 CAPSULE | Refills: 0 | Status: SHIPPED | OUTPATIENT
Start: 2023-12-11 | End: 2023-12-18

## 2023-12-11 RX ORDER — DOXYCYCLINE HYCLATE 100 MG
100 TABLET ORAL ONCE
Status: COMPLETED | OUTPATIENT
Start: 2023-12-11 | End: 2023-12-11

## 2023-12-11 RX ADMIN — ONDANSETRON 4 MG: 2 INJECTION INTRAMUSCULAR; INTRAVENOUS at 22:15

## 2023-12-11 RX ADMIN — LIDOCAINE HYDROCHLORIDE 500 MG: 10 INJECTION, SOLUTION EPIDURAL; INFILTRATION; INTRACAUDAL; PERINEURAL at 23:12

## 2023-12-11 RX ADMIN — CEFDINIR 300 MG: 300 CAPSULE ORAL at 22:08

## 2023-12-11 RX ADMIN — ACETAMINOPHEN 1000 MG: 500 TABLET ORAL at 22:08

## 2023-12-11 RX ADMIN — METRONIDAZOLE 500 MG: 250 TABLET ORAL at 22:33

## 2023-12-11 RX ADMIN — SODIUM CHLORIDE 1000 ML: 9 INJECTION, SOLUTION INTRAVENOUS at 22:15

## 2023-12-11 RX ADMIN — DOXYCYCLINE HYCLATE 100 MG: 100 TABLET, COATED ORAL at 22:33

## 2023-12-11 ASSESSMENT — PAIN DESCRIPTION - PAIN TYPE: TYPE: ACUTE PAIN

## 2023-12-11 ASSESSMENT — PAIN DESCRIPTION - LOCATION: LOCATION: GENERALIZED

## 2023-12-11 ASSESSMENT — PAIN SCALES - GENERAL: PAINLEVEL_OUTOF10: 7

## 2023-12-11 ASSESSMENT — PAIN DESCRIPTION - DESCRIPTORS: DESCRIPTORS: ACHING

## 2023-12-11 ASSESSMENT — PAIN - FUNCTIONAL ASSESSMENT: PAIN_FUNCTIONAL_ASSESSMENT: 0-10

## 2023-12-12 LAB — BACTERIA UR CULT: NORMAL

## 2023-12-12 NOTE — ED PROVIDER NOTES
[address] [address] ED  EMERGENCY DEPARTMENT ENCOUNTER        Pt Name: Chilo Perry  MRN: [id number]  [address] [facility] 2000  Date of evaluation: 12/11/2023  Provider: NAN Singleton  PCP: [facility]  Note Started: 8:57 PM EST 12/11/23      ASHWIN. I have evaluated this patient. CHIEF COMPLAINT       Chief Complaint   Patient presents with    Emesis     Patient reports vomiting, HA, body aches since Thursday. HISTORY OF PRESENT ILLNESS: 1 or more Elements     History from : Patient    Limitations to history : None    Chilo Perry is a 26 y.o. female with past medical history of liver transplant for biliary atresia in 2001. Currently on tacrolimus. She presents for evaluation of vomiting headache body aches since Thursday. Patient that she started having nausea vomiting Thursday then started having left-sided flank pain 2 days later. She denies any known fevers. She does note that when she was incarcerated she had some thick vaginal discharge but that got better. She has no other concern for STI or pregnancy. She denies hematuria but does endorse dysuria. She endorses cough runny nose denies significant sore throat. Notes generalized bodyaches and fatigue. Denies abdominal pain other than the flank pain. She denies chest pain or significant shortness of breath. She has no other acute concerns or complaints at this time. Patient was last seen and evaluated by pediatric gastroenterology November 7, 2023 this note was reviewed by myself, she was instructed at that time to continue tacrolimus and over time the plan was to work on transferring her care to adult hepatology. She had blood work at that time which showed normal BUN and creatinine. Nursing Notes were all reviewed and agreed with or any disagreements were addressed in the HPI. REVIEW OF SYSTEMS :      Review of Systems    Positives and Pertinent negatives as per HPI.      SURGICAL

## 2023-12-12 NOTE — DISCHARGE INSTRUCTIONS
take the antibiotic prescribed until all of the pills are gone-- even if you feel better. 2. Drink plenty of fluids. 3. You may continue to have burning or frequent urination for 2 to 3 days after treatment has been started. 4. If burning, frequent urination, or fever continues for greater than 3 days, call your physician or return to the Emergency Department. 5. No alcohol. 6. Drink cranberry juice, grape juice, or other juices daily. 7. Always urinate following sexual intercourse to remove any germs that may remain around the opening to the bladder. 8. Take medication as prescribed by your physician. Your doctor may have prescribed medicine that will make your urine bright orange, so do not be alarmed if this occurs.

## 2023-12-14 LAB
C TRACH DNA CVX QL NAA+PROBE: NEGATIVE
N GONORRHOEA DNA CERV MUCUS QL NAA+PROBE: NEGATIVE